# Patient Record
Sex: MALE | Race: WHITE | URBAN - METROPOLITAN AREA
[De-identification: names, ages, dates, MRNs, and addresses within clinical notes are randomized per-mention and may not be internally consistent; named-entity substitution may affect disease eponyms.]

---

## 2018-03-02 ENCOUNTER — THERAPY VISIT (OUTPATIENT)
Dept: SPEECH THERAPY | Facility: CLINIC | Age: 63
End: 2018-03-02
Payer: MEDICARE

## 2018-03-02 DIAGNOSIS — R49.1 APHONIA: ICD-10-CM

## 2018-03-02 DIAGNOSIS — C32.9 LARYNGEAL CANCER (H): Primary | ICD-10-CM

## 2018-03-02 NOTE — MR AVS SNAPSHOT
"              After Visit Summary   3/2/2018    Mariano Muhammad    MRN: [id number]           Patient Information     Date Of Birth          1955        Visit Information        Provider Department      3/2/2018 1:00 PM Alicia, Aretha, SLP [facility]        Today's Diagnoses     Laryngeal cancer (H)    -  1    Aphonia           Follow-ups after your visit        Additional Services     SPEECH THERAPY REFERRAL       *This therapy referral will be filtered to a centralized scheduling office at [facility] and the patient will receive a call to schedule an appointment at a [place] location most convenient for them. *     [facility] provides Speech Therapy evaluation and treatment and many specialty services across the [place] system.  If requesting a specialty program, please choose from the list below.  If you have not heard from the scheduling office within 2 business days, please call [phone] for all locations, with the exception of [place], please call [phone] and [facility], please call [phone]      Treatment: Evaluation & Treatment  Speech Treatment Diagnosis: Fitting and Adjustment of Other Specified Prosthetic Device  Special Instructions: TEP eval. Does not need to be scheduled.   Special Programs: Tracheoesophageal voice prosthesis (TEP)  If the patient is unable to tolerate prosthesis replacement, 2% oral viscous Lidocaine may be administered at dose of 15 mL or 3% Lidocaine trans-stomal spray, 1-6 sprays of 0.1 cc spray       Please be aware that coverage of these services is subject to the terms and limitations of your health insurance plan.  Call member services at your health plan with any benefit or coverage questions.      **Note to Provider:  If you are referring outside of [place] for the therapy appointment, please list the name of the location in the \"special instructions\" above, print the referral and give to the patient to " schedule the appointment.                  Who to contact     Please call your clinic at [phone] to:    Ask questions about your health    Make or cancel appointments    Discuss your medicines    Learn about your test results    Speak to your doctor            Additional Information About Your Visit        MyChart Information     People[employer] is an electronic gateway that provides easy, online access to your medical records. With People[employer], you can request a clinic appointment, read your test results, renew a prescription or communicate with your care team.     To sign up for [employer] visit the website at [email or web address]   You will be asked to enter the access code listed below, as well as some personal information. Please follow the directions to create your username and password.     Your access code is: [email or web address]  Expires: 2018  6:30 AM     Your access code will  in 90 days. If you need help or a new code, please contact your [facility] or call [phone] for assistance.        Care EveryWhere ID     This is your Care EveryWhere ID. This could be used by other organizations to access your [place] medical records  [id number]         Blood Pressure from Last 3 Encounters:   No data found for BP    Weight from Last 3 Encounters:   No data found for Wt              Today, you had the following     No orders found for display       Primary Care Provider    None Specified       No primary provider on file.        Equal Access to Services     CLAUDINE ARDON : Hadii poli Duffy, waaxda luqadaha, qaybta kaalmada rommel, sid yi. So [facility] [phone].    ATENCIÓN: Si habla español, tiene a zurita disposición servicios gratuitos de asistencia lingüística. Llame al [phone].    We comply with applicable federal civil rights laws and [place] laws. We do not discriminate on the basis of race, color, national origin, age,  disability, sex, sexual orientation, or gender identity.            Thank you!     Thank you for choosing [facility]  for your care. Our goal is always to provide you with excellent care. Hearing back from our patients is one way we can continue to improve our services. Please take a few minutes to complete the written survey that you may receive in the mail after your visit with us. Thank you!             Your Updated Medication List - Protect others around you: Learn how to safely use, store and throw away your medicines at www.disposemymeds.org.      Notice  As of 3/2/2018 11:59 PM    You have not been prescribed any medications.

## 2018-03-15 NOTE — PROGRESS NOTES
03/02/18 1300   General Patient Information   Start of Care Date 03/02/18   Referring Physician Dr. Be Oakes   Orders Eval and Treat   Orders Comment TEP eval   Orders Date 03/02/18   Medical Diagnosis laryngeal cancer; aphonia   Onset of illness/injury or Date of Surgery (12/2016)   Precautions/Limitations no known precautions/limitations   Surgical/Medical history reviewed Yes   Pertinent history of current problem Mariano Muhammad is a 63-year-old male with a PMH significant for laryngeal cancer s/p total laryngectomy in 12/2016 and secondary TEP placement which he reported was placed ~4 months ago. Pt recently moved to the area from [place]. He arrived to clinic d/t report of leakage through prosthesis.    Hearing WNL for conversational level of speech   Vision WNL   Dentition WFL   Present Hydration/Nutrition Method Oral diet   Diet Regular textures, thin liquids   Primary Communication Method TEP   Previous Therapy Recently placed TEP    Fall Risk Screen   Have you fallen 2 or more times in the past year? No   Have you fallen and had an injury in the past year? No   Is patient a fall risk? No   Pain Assessment   Pain Reported No   Evaluation Results   Evaluation Results Behavioral Observations;Communication Observations;Oral Motor Structure Assessment   Behavioral Observations WFL, very pleasant   Communication Observations Pt communicates with TEP, 100% intelligible   Oral Motor Structure Assessment All are WFL with the exception of laryngeal functioning secondary to total laryngectomy.   Postoperative Assessment   Postoperative Assessment Tracheoesophageal Voice Prosthesis;Insertion/Replacement   Tracheoesophageal Voice Prosthesis   Current Prosthesis Intact   Brand [employer]   Type Indwelling   Length Unknown   Diameter 16 [place]   Date Of Placement (pt reports having prosthesis placed ~4 months ago)   Examination Of Current Prosthesis Prosthesis appearing long in tract. Mild yeast on prosthesis.     Voicing Function WFL   Swallowing Function Leakage   Antifungal Strategies None   Reason For Replacement Valve Failure   Insertion/replacement   Insertion/replacement Given The Above Observations, The Replacement Tracheoesophageal Voice Prosthesis Was Prepared For Insertion According To The  s Instructions.;Brand;Type;Length;Diameter;Insertion Complications;Voicing Function Following Replacement;Swallowing Function Following Replacement   Brand [employer]   Type Indwelling   Length 6 mm   Diameter 16 Romansh   Insertion Complications None   Voicing Function Following Replacement Baseline, functional    Swallowing Function Following Replacement No aspiration/leakage noted   Impressions And Recommendations   Impressions And Recommendations Overall Fas Score/level Of Impairment;Communication Diagnosis;Summary;Recommendations;Frequency / Duration Of Treatment;Prognosis   Overall Fas Score/level Of Impairment 6   Communication Diagnosis Functional TEP communication    Summary Pt presents with functional alaryngeal communication and no leakage/aspiration after voice prosthesis replacement today. There were no complications encountered during replacement of prosthesis. Pt was re-measured to ensure most appropriate prosthesis was placed prior to replacement of prosthesis today. Pt will continue to benefit from daily use of heat and moisture exchange (HME) system and baseplates for pulmonary rehabilitation. He participated in 1x treatment after completion of evaluation.    Recommendations Return as soon as leakage is encountered or if difficulty with voicing occurs.    Frequency / Duration Of Treatment 1x treatment; as indiciated above afterwards   Prognosis Good With Intervention   Alaryngeal Goals   Alaryngeal Goals 1   Alaryngeal Goal 1   Goal Identifier Education   Goal Description 1. Pt will verbalize/demonstrate understanding of TEP management and cares during emergency situations as judged by SLP  assessment.    Target Date 03/02/18   Date Met 03/02/18   General Therapy Interventions   Planned Therapy Interventions Communication   Communication Speaking valve instruction   Intervention Comments 1x treatment   Equipment   Equipment [employer] 16 Fr 6 mm, plug, red yap catheter   Communication with other professionals   Communication with other professionals Discussed with SLP Sae Giang   Education   Learner Patient;Family   Readiness Acceptance   Method Explanation;Demonstration   Response Verbalizes understanding;Demonstrates understanding   Prescriptions Heat/moisture Exchanger (hme)   Therapy Certification   Certification date from 03/02/18   Certification date to 03/02/18   Medical Diagnosis laryngeal cancer; aphonia   Certification I certify the need for these services furnished under this plan of treatment and while under my care.  (Physician co-signature of this document indicates review and certification of the therapy plan).   Total Evaluation Time   Total Evaluation Time 45     Thank you for the referral of Mariano Muhammad. If you have any questions about this report, please contact me using the information below.     Aretha Vargas MA, CCC-SLP  Speech-Language Pathologist   [facility] [place]  Department of Otolaryngology/D&T - 4th floor  Pager: [phone]  Phone: [phone]  Email: [email or web address]

## 2019-03-07 ENCOUNTER — TELEPHONE (OUTPATIENT)
Dept: OTOLARYNGOLOGY | Facility: CLINIC | Age: 64
End: 2019-03-07

## 2019-03-07 NOTE — TELEPHONE ENCOUNTER
[facility] Call Center    Phone Message    May a detailed message be left on voicemail: yes    Reason for Call: Other: Jennifer:  Calling regarding pt's laryngectomy supplies, please call Jennifer back to further assist.   Fax: [phone]     Action Taken: Message routed to:  [facility] ([facility] ENT